# Patient Record
Sex: FEMALE | Race: WHITE | NOT HISPANIC OR LATINO | URBAN - METROPOLITAN AREA
[De-identification: names, ages, dates, MRNs, and addresses within clinical notes are randomized per-mention and may not be internally consistent; named-entity substitution may affect disease eponyms.]

---

## 2022-04-27 ENCOUNTER — NEW PATIENT (OUTPATIENT)
Dept: URBAN - METROPOLITAN AREA CLINIC 14 | Facility: CLINIC | Age: 67
End: 2022-04-27

## 2022-04-27 DIAGNOSIS — H02.88B: ICD-10-CM

## 2022-04-27 DIAGNOSIS — H02.88A: ICD-10-CM

## 2022-04-27 DIAGNOSIS — H25.13: ICD-10-CM

## 2022-04-27 DIAGNOSIS — H04.123: ICD-10-CM

## 2022-04-27 PROCEDURE — 92002 INTRM OPH EXAM NEW PATIENT: CPT

## 2022-04-27 ASSESSMENT — VISUAL ACUITY
OS_SC: 20/25
OU_SC: 20/20
OD_SC: 20/25

## 2022-04-27 ASSESSMENT — TONOMETRY
OS_IOP_MMHG: 12
OD_IOP_MMHG: 13

## 2022-04-27 NOTE — PATIENT DISCUSSION
Discussed findings, not visually significant at this time. s/p LASIK OU. Continue w/ otc readers. Pt to call with any changes to vision. Monitor.